# Patient Record
Sex: FEMALE | URBAN - NONMETROPOLITAN AREA
[De-identification: names, ages, dates, MRNs, and addresses within clinical notes are randomized per-mention and may not be internally consistent; named-entity substitution may affect disease eponyms.]

---

## 2023-07-31 ENCOUNTER — OFFICE VISIT (OUTPATIENT)
Dept: URBAN - NONMETROPOLITAN AREA CLINIC 2 | Facility: CLINIC | Age: 23
End: 2023-07-31
Payer: MEDICAID

## 2023-07-31 ENCOUNTER — LAB OUTSIDE AN ENCOUNTER (OUTPATIENT)
Dept: URBAN - NONMETROPOLITAN AREA CLINIC 2 | Facility: CLINIC | Age: 23
End: 2023-07-31

## 2023-07-31 ENCOUNTER — DASHBOARD ENCOUNTERS (OUTPATIENT)
Age: 23
End: 2023-07-31

## 2023-07-31 VITALS
SYSTOLIC BLOOD PRESSURE: 130 MMHG | TEMPERATURE: 98.6 F | HEART RATE: 109 BPM | WEIGHT: 220 LBS | DIASTOLIC BLOOD PRESSURE: 80 MMHG | HEIGHT: 66 IN | BODY MASS INDEX: 35.36 KG/M2

## 2023-07-31 DIAGNOSIS — D50.9 IRON DEFICIENCY ANEMIA, UNSPECIFIED IRON DEFICIENCY ANEMIA TYPE: ICD-10-CM

## 2023-07-31 DIAGNOSIS — K59.1 FUNCTIONAL DIARRHEA: ICD-10-CM

## 2023-07-31 DIAGNOSIS — Z83.79 FAMILY HISTORY OF CROHN'S DISEASE: ICD-10-CM

## 2023-07-31 DIAGNOSIS — Z98.890 HISTORY OF ESOPHAGOGASTRODUODENOSCOPY (EGD): ICD-10-CM

## 2023-07-31 DIAGNOSIS — R10.12 LEFT UPPER QUADRANT ABDOMINAL PAIN: ICD-10-CM

## 2023-07-31 PROBLEM — 160386006: Status: ACTIVE | Noted: 2023-07-31

## 2023-07-31 PROBLEM — 301715003: Status: ACTIVE | Noted: 2023-07-31

## 2023-07-31 PROBLEM — 87522002: Status: ACTIVE | Noted: 2023-07-31

## 2023-07-31 PROBLEM — 47812002: Status: ACTIVE | Noted: 2023-07-31

## 2023-07-31 PROBLEM — 416940007: Status: ACTIVE | Noted: 2023-07-31

## 2023-07-31 PROCEDURE — 99204 OFFICE O/P NEW MOD 45 MIN: CPT | Performed by: NURSE PRACTITIONER

## 2023-07-31 PROCEDURE — 99244 OFF/OP CNSLTJ NEW/EST MOD 40: CPT | Performed by: NURSE PRACTITIONER

## 2023-07-31 RX ORDER — HYOSCYAMINE SULFATE 0.12 MG/1
1 TABLET UNDER THE TONGUE AND ALLOW TO DISSOLVE AS NEEDED TABLET SUBLINGUAL
Qty: 60 TABLETS | Refills: 11 | OUTPATIENT
Start: 2023-07-31 | End: 2024-07-25

## 2023-07-31 RX ORDER — ATOMOXETINE 40 MG/1
1 CAPSULE IN THE MORNING CAPSULE ORAL ONCE A DAY
Status: ACTIVE | COMMUNITY

## 2023-07-31 RX ORDER — TRAZODONE HYDROCHLORIDE 50 MG/1
TAKE 1 TABLET BY MOUTH EVERY DAY AT BEDTIME AS NEEDED FOR SLEEP TABLET ORAL
Qty: 30 EACH | Refills: 1 | Status: ACTIVE | COMMUNITY

## 2023-07-31 RX ORDER — SERTRALINE 100 MG/1
1 TABLET TABLET, FILM COATED ORAL ONCE A DAY
Status: ACTIVE | COMMUNITY

## 2023-07-31 RX ORDER — RIFAXIMIN 550 MG/1
1 TABLET TABLET ORAL TWICE A DAY
Qty: 60 | OUTPATIENT
Start: 2023-07-31 | End: 2023-08-30

## 2023-07-31 RX ORDER — HYDROXYZINE PAMOATE 25 MG/1
TAKE 1 CAPSULE BY MOUTH TWICE A DAY AS NEEDED FOR ANXIETY CAPSULE ORAL
Qty: 60 EACH | Refills: 0 | Status: ACTIVE | COMMUNITY

## 2023-07-31 RX ORDER — ESOMEPRAZOLE MAGNESIUM 20 MG/1
1 CAPSULE CAPSULE, DELAYED RELEASE ORAL ONCE A DAY
Qty: 30 | Status: ACTIVE | COMMUNITY
Start: 2023-07-31

## 2023-07-31 NOTE — HPI-TODAY'S VISIT:
7/31/2023 Tiago is a 22-year-old female referred to me by her primary care physician for left-sided abdominal pain and diarrhea.  A copy this note be sent to the referring physician.  In her [employer] year of high school she developed left upper quadrant abdominal pain.  She had an EGD with gastritis and was prescribed Nexium.  This seemed to help initially but now her symptoms have returned.  About once a week she will have acute abdominal pain that is made worse with diarrhea.  She does have a bowel movement most days.  She denies any nocturnal diarrhea.  She she has had rectal bleeding in the past but not recently.  Denies any mucus in her stools, she has recently had a CBC with a normal hemoglobin of 13 but she is on oral iron.  Last year she was diagnosed with iron deficiency anemia and placed on oral iron.  She does take oral iron 3 times daily and her hemoglobin is above 13.  Her percent saturation is still mildly decreased along with her ferritin.  She is never had a colonoscopy.  Her mother has Crohn's disease.  Today we have discussed the work-up.  We will check her inflammatory markers celiac titers and her stool studies including her fecal calprotectin.  We will follow-up pending her labs, if her work-up is negative we will treat her with a course of Xifaxan for IBS-D.  If her hemoglobin drops, or no improvement with normal work-up would recommend colonoscopy.  MB

## 2023-08-01 ENCOUNTER — TELEPHONE ENCOUNTER (OUTPATIENT)
Dept: URBAN - NONMETROPOLITAN AREA CLINIC 13 | Facility: CLINIC | Age: 23
End: 2023-08-01

## 2023-08-01 LAB
(TTG) AB, IGA: <1
(TTG) AB, IGG: <1
A/G RATIO: 1.5
ABSOLUTE BASOPHILS: 43
ABSOLUTE EOSINOPHILS: 60
ABSOLUTE LYMPHOCYTES: 1870
ABSOLUTE MONOCYTES: 425
ABSOLUTE NEUTROPHILS: 6103
ALBUMIN: 4.2
ALKALINE PHOSPHATASE: 58
ALT (SGPT): 22
ANTIGLIADIN ABS, IGA: <1
AST (SGOT): 17
BASOPHILS: 0.5
BILIRUBIN, TOTAL: 0.2
BUN/CREATININE RATIO: (no result)
BUN: 12
C-REACTIVE PROTEIN, QUANT: 10.9
CALCIUM: 9.6
CARBON DIOXIDE, TOTAL: 24
CHLORIDE: 103
CREATININE: 0.73
EGFR: 121
EOSINOPHILS: 0.7
GLIADIN (DEAMIDATED) AB (IGA): <1
GLIADIN (DEAMIDATED) AB (IGG): <1
GLOBULIN, TOTAL: 2.8
GLUCOSE: 97
HEMATOCRIT: 40.5
HEMOGLOBIN: 13.5
IMMUNOGLOBULIN A: 124
IMMUNOGLOBULIN A: 124
INTERPRETATION: (no result)
LYMPHOCYTES: 22
MCH: 27.8
MCHC: 33.3
MCV: 83.3
MONOCYTES: 5
MPV: 10.1
NEUTROPHILS: 71.8
PLATELET COUNT: 357
POTASSIUM: 4.3
PROTEIN, TOTAL: 7
RDW: 12.6
RED BLOOD CELL COUNT: 4.86
SED RATE BY MODIFIED: 2
SODIUM: 139
T-TRANSGLUTAMINASE (TTG) IGA: <1
TISSUE TRANSGLUTAMINASE AB, IGA: <1
WHITE BLOOD CELL COUNT: 8.5

## 2023-08-02 ENCOUNTER — WEB ENCOUNTER (OUTPATIENT)
Dept: URBAN - NONMETROPOLITAN AREA CLINIC 2 | Facility: CLINIC | Age: 23
End: 2023-08-02

## 2023-08-02 ENCOUNTER — TELEPHONE ENCOUNTER (OUTPATIENT)
Dept: URBAN - NONMETROPOLITAN AREA CLINIC 2 | Facility: CLINIC | Age: 23
End: 2023-08-02

## 2023-08-03 ENCOUNTER — TELEPHONE ENCOUNTER (OUTPATIENT)
Dept: URBAN - NONMETROPOLITAN AREA CLINIC 13 | Facility: CLINIC | Age: 23
End: 2023-08-03

## 2023-08-15 LAB
CALPROTECTIN, FECAL: 85
CAMPYLOBACTER SPP. AG,EIA: (no result)
CLOSTRIDIUM DIFFICILE: (no result)
OVA AND PARASITES, CONC AND PERM SMEAR: (no result)
RESULT:: (no result)
SALMONELLA AND SHIGELLA, CULTURE: (no result)
SHIGA TOXINS, EIA W/RFL TO E.COLI O157 CULTURE: (no result)

## 2023-11-27 ENCOUNTER — OFFICE VISIT (OUTPATIENT)
Dept: URBAN - NONMETROPOLITAN AREA CLINIC 2 | Facility: CLINIC | Age: 23
End: 2023-11-27